# Patient Record
Sex: MALE | Race: WHITE | NOT HISPANIC OR LATINO | URBAN - METROPOLITAN AREA
[De-identification: names, ages, dates, MRNs, and addresses within clinical notes are randomized per-mention and may not be internally consistent; named-entity substitution may affect disease eponyms.]

---

## 2020-10-07 ENCOUNTER — OFFICE (OUTPATIENT)
Dept: URBAN - METROPOLITAN AREA CLINIC 11 | Facility: CLINIC | Age: 35
End: 2020-10-07

## 2020-10-07 VITALS
HEIGHT: 73 IN | WEIGHT: 257 LBS | DIASTOLIC BLOOD PRESSURE: 72 MMHG | HEART RATE: 67 BPM | OXYGEN SATURATION: 91 % | SYSTOLIC BLOOD PRESSURE: 134 MMHG

## 2020-10-07 DIAGNOSIS — Z80.0 FAMILY HISTORY OF MALIGNANT NEOPLASM OF DIGESTIVE ORGANS: ICD-10-CM

## 2020-10-07 DIAGNOSIS — K92.0 HEMATEMESIS: ICD-10-CM

## 2020-10-07 DIAGNOSIS — K92.1 MELENA: ICD-10-CM

## 2020-10-07 DIAGNOSIS — K21.9 GASTRO-ESOPHAGEAL REFLUX DISEASE WITHOUT ESOPHAGITIS: ICD-10-CM

## 2020-10-07 LAB
CBC, PLATELET, NO DIFFERENTIAL: HEMATOCRIT: 46.1 % (ref 37.5–51)
CBC, PLATELET, NO DIFFERENTIAL: HEMOGLOBIN: 14.9 G/DL (ref 13–17.7)
CBC, PLATELET, NO DIFFERENTIAL: MCH: 27.1 PG (ref 26.6–33)
CBC, PLATELET, NO DIFFERENTIAL: MCHC: 32.3 G/DL (ref 31.5–35.7)
CBC, PLATELET, NO DIFFERENTIAL: MCV: 84 FL (ref 79–97)
CBC, PLATELET, NO DIFFERENTIAL: NRBC: (no result)
CBC, PLATELET, NO DIFFERENTIAL: PLATELETS: 258 X10E3/UL (ref 150–450)
CBC, PLATELET, NO DIFFERENTIAL: RBC: 5.49 X10E6/UL (ref 4.14–5.8)
CBC, PLATELET, NO DIFFERENTIAL: RDW: 13.1 % (ref 11.6–15.4)
CBC, PLATELET, NO DIFFERENTIAL: WBC: 8.8 X10E3/UL (ref 3.4–10.8)

## 2020-10-07 PROCEDURE — 99203 OFFICE O/P NEW LOW 30 MIN: CPT

## 2020-10-07 RX ORDER — SODIUM PICOSULFATE, MAGNESIUM OXIDE, AND ANHYDROUS CITRIC ACID 10; 3.5; 12 MG/160ML; G/160ML; G/160ML
LIQUID ORAL
Qty: 1 | Refills: 0 | Status: ACTIVE
Start: 2020-10-07

## 2020-10-07 RX ORDER — PANTOPRAZOLE SODIUM 40 MG/1
80 TABLET, DELAYED RELEASE ORAL
Qty: 60 | Refills: 2 | Status: ACTIVE
Start: 2020-10-07

## 2020-10-07 NOTE — SERVICEHPINOTES
Mr. Cutler is a 36 y/o WM who was referred by Dr. Rushing for evaluation of GERD. While he has a many year history of GERD with pyrosis and regurgitation, since 9/25 he has acute onset of hematemesis with bright red blood. It occurs 1-2 times per day with unpredictable timing. It is not related to meals. In fact, there is less nausea and reflux after eating. He also has melena during this time. He has taken pantoprazole 20mg from 9/25 until yesterday and that has reduced the reflux but not necessarily the hematemesis. There is an occasional "stitch" of pain in his right flank that lasts 60-90 minutes and resolves with passage of bowel movement or vomiting.  He denies any NSAID use.He states his diet plays a role in his GERD symptoms. His diet is high in fat (frequent fast food). He has two episodes of rectal bleeding described as bright red blood in a wipe type pattern.He has never had a screening colonoscopy. His father had colon cancer at age 32. He now has esophageal cancer. His paternal grandmother had colon polyps, he is unsure the age. He is (over)due for a screening colonoscopy. Impression/Plan:BR- Hematemesis. Emergent EGD indicated. Patient declines evaluation at the ER. Vital signs are stable. EGD for tomorrow. PPI. BR-GERD. I discussed starting a PPI and will decrease to lowest effective dose.   BR-Screening colonoscopy due to first degree relative with colon cancer - father age 32.